# Patient Record
Sex: FEMALE | Race: WHITE
[De-identification: names, ages, dates, MRNs, and addresses within clinical notes are randomized per-mention and may not be internally consistent; named-entity substitution may affect disease eponyms.]

---

## 2018-02-09 ENCOUNTER — HOSPITAL ENCOUNTER (EMERGENCY)
Dept: HOSPITAL 31 - C.ER | Age: 53
Discharge: HOME | End: 2018-02-09
Payer: MEDICAID

## 2018-02-09 VITALS — DIASTOLIC BLOOD PRESSURE: 85 MMHG | SYSTOLIC BLOOD PRESSURE: 124 MMHG | RESPIRATION RATE: 16 BRPM | HEART RATE: 81 BPM

## 2018-02-09 VITALS — TEMPERATURE: 97.9 F

## 2018-02-09 VITALS — OXYGEN SATURATION: 99 %

## 2018-02-09 VITALS — BODY MASS INDEX: 29.6 KG/M2
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
## 2018-02-09 DIAGNOSIS — S86.911A: Primary | ICD-10-CM
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
## 2018-02-09 PROCEDURE — 96372 THER/PROPH/DIAG INJ SC/IM: CPT

## 2018-02-09 PROCEDURE — 99283 EMERGENCY DEPT VISIT LOW MDM: CPT

## 2018-02-09 NOTE — C.PDOC
History Of Present Illness


53 y/o female presents to the ED with c/o right leg pain for 8 days. Patient 

states she was walking when she stepped the wrong way and felt a sharp pain in 

her right calf. Since then, the pain has persisted and is worse with movement. 

Patient was evaluated by her PMD who instructed her to come to the ED for an 

Ultrasound. She denies chest pain, shortness of breath, changes in sensation, 

recent travel. 


Time Seen by Provider: 02/09/18 18:10


Chief Complaint (Nursing): Lower Extremity Problem/Injury


History Per: Patient,  (Matt )


History/Exam Limitations: language barrier


Onset/Duration Of Symptoms: Days (8)


Current Symptoms Are (Timing): Still Present


Recent travel outside of the [place]: No


Additional History Per: Patient





- Ankle/Foot


Description Of Injury: denies: Fell, Struck With Object, Struck Against Object





Past Medical History


Reviewed: Historical Data, Nursing Documentation, Vital Signs


Vital Signs: 


 Last Vital Signs











Temp  97.9 F   02/09/18 17:55


 


Pulse  81   02/09/18 19:29


 


Resp  16   02/09/18 19:29


 


BP  124/85   02/09/18 19:29


 


Pulse Ox  99   02/09/18 21:09














- Medical History


PMH: No Chronic Diseases


   Denies: Diabetes


Surgical History: No Surg Hx


Family History: States: Unknown Family Hx





- Social History


Hx Alcohol Use: No


Hx Substance Use: No





- Immunization History


Hx Tetanus Toxoid Vaccination: No


Hx Influenza Vaccination: No


Hx Pneumococcal Vaccination: No





Review Of Systems


Cardiovascular: Negative for: Chest Pain


Respiratory: Negative for: Shortness of Breath


Musculoskeletal: Positive for: Other (right leg pain )


Neurological: Negative for: Weakness, Numbness





Physical Exam





- Physical Exam


Appears: Non-toxic, No Acute Distress


Skin: Normal Color, Warm, Dry


Head: Atraumatic, Normacephalic


Eye(s): bilateral: Normal Inspection, EOMI


Oral Mucosa: Moist


Neck: Supple


Chest: Symmetrical, No Deformity, No Tenderness


Cardiovascular: Rhythm Regular


Respiratory: Normal Breath Sounds, No Rales, No Rhonchi, No Wheezing


Extremity: Normal ROM, Calf Tenderness (right), Capillary Refill (less than 2 

seconds ), No Swelling, Other (nontender varicose veins noted in right lower 

extremity. no erythema )


Extremity: Bilateral: Atraumatic


Pulses: Left Dorsalis Pedis: Normal, Right Dorsalis Pedis: Normal


Neurological/Psych: Oriented x3, Normal Speech, Normal Cognition, Normal 

Sensation


Gait: Steady





ED Course And Treatment


O2 Sat by Pulse Oximetry: 99 (on RA)


Pulse Ox Interpretation: Normal


Progress Note: Vascular study is not available. Pt will receive Lovenox and 

return tomorrow for vascular study.  Patient verbalizes understanding to return 

to the ED tomorrow in order to undergo vascular study.





Disposition





- Disposition


Disposition: HOME/ ROUTINE


Disposition Time: 19:11


Condition: STABLE


Additional Instructions: 


Return to ER tomorrow at 9am for vascular study.





Regrese a la monroe de emergencias maana a las 9 a.m. para estudio vascular.


Instructions:  Leg Pain (ED)


Forms:  [facility] (English)


Print Language: Tamazight





- Clinical Impression


Clinical Impression: 


 Muscle strain of lower leg








- PA / NP / Resident Statement


MD/DO has reviewed & agrees with the documentation as recorded.





- Scribe Statement


The provider has reviewed the documentation as recorded by the Scribe (Bobbi Pederson)








All medical record entries made by the Scribe were at my direction and 

personally dictated by me. I have reviewed the chart and agree that the record 

accurately reflects my personal performance of the history, physical exam, 

medical decision making, and the department course for this patient. I have 

also personally directed, reviewed, and agree with the discharge instructions 

and disposition.

## 2018-02-10 ENCOUNTER — HOSPITAL ENCOUNTER (EMERGENCY)
Dept: HOSPITAL 31 - C.ER | Age: 53
Discharge: HOME | End: 2018-02-10
Payer: MEDICAID

## 2018-02-10 VITALS — DIASTOLIC BLOOD PRESSURE: 78 MMHG | SYSTOLIC BLOOD PRESSURE: 125 MMHG | RESPIRATION RATE: 16 BRPM | HEART RATE: 84 BPM

## 2018-02-10 VITALS — TEMPERATURE: 97.6 F | OXYGEN SATURATION: 98 %

## 2018-02-10 VITALS — BODY MASS INDEX: 29.6 KG/M2

## 2018-02-10 DIAGNOSIS — M79.661: Primary | ICD-10-CM

## 2018-02-10 NOTE — C.PDOC
History Of Present Illness


52 yr old female refereed to ER for a dopplar. Patient was seen on 02/09 for 

right leg/calf pain for the past 8 days and was told to come back for a dopplar 

and given Lovenox as precautionary measures. Patient denies chest pain, SOB, 

nausea, vomiting, weakness or numbness.


Time Seen by Provider: 02/10/18 09:18


Chief Complaint (Nursing): Lower Extremity Problem/Injury


History Per: Patient


History/Exam Limitations: no limitations


Onset/Duration Of Symptoms: Days (8 days)





Past Medical History


Reviewed: Historical Data, Nursing Documentation, Vital Signs


Vital Signs: 


 Last Vital Signs











Temp  97.6 F   02/10/18 08:35


 


Pulse  72   02/10/18 08:35


 


Resp  18   02/10/18 08:35


 


BP  143/84   02/10/18 08:35


 


Pulse Ox  98   02/10/18 10:46











Family History: States: No Known Family Hx





- Social History


Hx Alcohol Use: No


Hx Substance Use: No





- Immunization History


Hx Tetanus Toxoid Vaccination: No


Hx Influenza Vaccination: No


Hx Pneumococcal Vaccination: No





Review Of Systems


Except As Marked, All Systems Reviewed And Found Negative.


Cardiovascular: Negative for: Chest Pain


Respiratory: Negative for: Shortness of Breath


Gastrointestinal: Negative for: Nausea, Vomiting


Musculoskeletal: Positive for: Leg Pain (right leg/calf pain)


Neurological: Negative for: Weakness, Numbness





Physical Exam





- Physical Exam


Appears: Non-toxic, No Acute Distress


Skin: Warm, Dry, No Rash


Oral Mucosa: Moist


Lips: Normal Appearing


Chest: Symmetrical, No Tenderness


Respiratory: Normal Breath Sounds, No Rales, No Rhonchi, No Stridor, No Wheezing


Extremity: Normal ROM, No Calf Tenderness, No Swelling


Extremity: Bilateral: Normal Color And Temperature, Normal ROM


Neurological/Psych: Oriented x3, Normal Speech, Normal Motor, Normal Sensation





ED Course And Treatment


O2 Sat by Pulse Oximetry: 98 (RA)


Pulse Ox Interpretation: Normal





- Other Rad


  ** VENOUS RLE


X-Ray: Viewed By Me


Interpretation: PER VASC TECH REPORT: NEG





Medical Decision Making


Medical Decision Making: 


PLAN:


* Venous Duplex 


* Lovenox SC 








Disposition


Counseled Patient/Family Regarding: Diagnosis, Need For Followup





- Disposition


Referrals: 


YOUR,PMD [Other]


Disposition: HOME/ ROUTINE


Disposition Time: 10:33


Condition: GOOD


Instructions:  Leg Pain (ED)


Forms:  [facility] (English)





- Clinical Impression


Clinical Impression: 


 Leg pain








- Scribe Statement


The provider has reviewed the documentation as recorded by the Micheleibe


Varsha Cortes


Provider Attestation: 


All medical record entries made by the Micheleibe were at my direction and 

personally dictated by me. I have reviewed the chart and agree that the record 

accurately reflects my personal performance of the history, physical exam, 

medical decision making, and the department course for this patient. I have 

also personally directed, reviewed, and agree with the discharge instructions 

and disposition.

## 2018-02-12 NOTE — VASCLAB
PROCEDURE:  Right Lower Extremity Venous Duplex Exam. 



HISTORY:

pain Right calf pain, swelling



PRIORS:

None. 



TECHNIQUE:

Right common femoral, femoral, popliteal and posterior tibial, 

peroneal and great saphenous veins were evaluated. Flow was assessed 

with color Doppler, compressibility, assessment of phasic flow and 

augmentation response.



Report prepared by   Gordy Jay, RVT 



FINDINGS:



RIGHT:

1. Common Femoral Vein: 



1.1. Compressibility - Fully compressible: Thrombus -  None: Flow - 

Phasic: Augmentation -Normal: Reflux - .



2. Femoral Vein: 



2.1. Compressibility - Fully compressible: Thrombus -  None: Flow - 

Phasic: Augmentation -Normal: Reflux - .



3. Popliteal Vein: 



3.1. Compressibility - Fully compressible: Thrombus -  None: Flow - 

Phasic: Augmentation -Normal: Reflux - .



4. Posterior Tibial Vein: 



4.1. Compressibility - Fully compressible: Thrombus -  None: Flow - : 

Augmentation -: Reflux - .



5. Peroneal Vein: 



5.1. Compressibility - Fully compressible: Thrombus - None:  Flow - : 

Augmentation -: Reflux - .



6. Great Saphenous Vein:

6.1.  Compressibility - Fully compressible: Thrombus -None: Flow - 

Phasic: Augmentation - : Reflux - .





OTHER FINDINGS:  



IMPRESSION:

No evidence of deep or superficial vein thrombosis of the right lower 

extremity.     



Normal venous flow noted in the left common femoral vein.